# Patient Record
Sex: MALE | HISPANIC OR LATINO | URBAN - METROPOLITAN AREA
[De-identification: names, ages, dates, MRNs, and addresses within clinical notes are randomized per-mention and may not be internally consistent; named-entity substitution may affect disease eponyms.]

---

## 2018-12-03 NOTE — IMPRESSION/PLAN
Impression: Other chronic allergic conjunctivitis: H10.45. Plan: Discussed condition with pt. Recommend use Opcon A for itching.

## 2018-12-03 NOTE — IMPRESSION/PLAN
Impression: Vitreous degeneration, bilateral: H43.813. Plan: Discussed diagnosis in detail with patient. No treatment is required at this time. Will continue to observe condition and or symptoms. Patient instructed to call if condition gets worse.

## 2018-12-03 NOTE — IMPRESSION/PLAN
Impression: Type 2 diabetes mellitus w/o complication: E86.4. Plan: Diabetes type II: no background retinopathy, no signs of neovascularization noted. Discussed ocular and systemic benefits of blood sugar control.

## 2020-02-26 NOTE — IMPRESSION/PLAN
Impression: Type 2 diabetes mellitus w/o complication: N87.5. OU. Plan: Diabetes type II: no background retinopathy, no signs of neovascularization noted. Discussed ocular and systemic benefits of blood sugar control.

## 2023-01-27 ENCOUNTER — OFFICE VISIT (OUTPATIENT)
Dept: URBAN - METROPOLITAN AREA CLINIC 23 | Facility: CLINIC | Age: 83
End: 2023-01-27
Payer: MEDICARE

## 2023-01-27 DIAGNOSIS — H02.831 DERMATOCHALASIS OF RIGHT UPPER EYELID: ICD-10-CM

## 2023-01-27 DIAGNOSIS — H16.223 KERATOCONJUNCTIVITIS SICCA, NOT SPECIFIED AS SJÖGREN'S, BILATERAL: ICD-10-CM

## 2023-01-27 DIAGNOSIS — H02.834 DERMATOCHALASIS OF LEFT UPPER EYELID: ICD-10-CM

## 2023-01-27 DIAGNOSIS — E11.9 TYPE 2 DIABETES MELLITUS W/O COMPLICATION: Primary | ICD-10-CM

## 2023-01-27 DIAGNOSIS — H25.13 AGE-RELATED NUCLEAR CATARACT, BILATERAL: ICD-10-CM

## 2023-01-27 PROCEDURE — 99214 OFFICE O/P EST MOD 30 MIN: CPT | Performed by: OPTOMETRIST

## 2023-01-27 ASSESSMENT — INTRAOCULAR PRESSURE
OD: 11
OS: 11

## 2023-01-27 ASSESSMENT — KERATOMETRY
OD: 43.88
OS: 42.88

## 2023-01-27 NOTE — IMPRESSION/PLAN
Impression: Type 2 diabetes mellitus w/o complication: L06.1. Plan: Discussed diagnosis in detail with patient. Advised and emphasized patient of blood sugar control. Discussed risks of progression. Poor compliance can lead to blindness. Optos performed and reviewed with patient today. Reassured patient of condition and treatment. Will continue to observe condition and or symptoms.

## 2023-01-27 NOTE — IMPRESSION/PLAN
Impression: Dermatochalasis of right upper eyelid: H02.831. Plan: Pt edu on all findings. Refer to Dr. Samanta Cohen for lid consult.

## 2023-02-20 ENCOUNTER — OFFICE VISIT (OUTPATIENT)
Dept: URBAN - METROPOLITAN AREA CLINIC 28 | Facility: CLINIC | Age: 83
End: 2023-02-20
Payer: MEDICARE

## 2023-02-20 DIAGNOSIS — G51.31 CLONIC HEMIFACIAL SPASM, RIGHT: Primary | ICD-10-CM

## 2023-02-20 DIAGNOSIS — H53.40 VISUAL FIELD DEFECT: ICD-10-CM

## 2023-02-20 DIAGNOSIS — Z86.73 PERSONAL HISTORY OF STROKE W/O RESIDUAL DEFICIT: ICD-10-CM

## 2023-02-20 PROCEDURE — 99204 OFFICE O/P NEW MOD 45 MIN: CPT | Performed by: OPHTHALMOLOGY

## 2023-02-20 PROCEDURE — 92083 EXTENDED VISUAL FIELD XM: CPT | Performed by: OPHTHALMOLOGY

## 2023-02-20 ASSESSMENT — INTRAOCULAR PRESSURE
OS: 17
OD: 17

## 2023-02-20 NOTE — IMPRESSION/PLAN
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 Plan: Patient examined. Chart reviewed. Patient has Hemifacial Spasm, and no prior h/o [place] Palsy. The standard of care for management is Botulinum Toxin A. 

36285 - CHEMODENERVATION OF MUSCLE(S) INNERVATED BY FACIAL NERVE, UNILATERAL (eg HFS or BEB) - BOTOX (50 units needed) [address]-91 Botulinum Toxin 100 unit vial.

## 2023-02-20 NOTE — IMPRESSION/PLAN
Impression: Visual field defect: H53.40. Plan: Patient subjectively describes peripheral field defect. VF ordered & reviewed. Formal visual field testing reflects the clinical concern at this time. Ptosis effect OD at 10 degrees untapped & 40 degrees taped improvement. Ptosis effect OS at 10 degrees untapped & 30 degrees taped improvement.

## 2023-02-20 NOTE — IMPRESSION/PLAN
Impression: Personal history of stroke w/o residual deficit: Z86.73. Plan: Stroke in 30's, numbness.

## 2023-04-17 NOTE — IMPRESSION/PLAN
Impression: Dermatochalasis of right lower eyelid: H02.832. Plan: Shilpa Pantoja WISHES TO PURSUE COSMETIC LOWER EYELID SURGERY.  PLAN 29975 - RT, LT.

## 2023-04-17 NOTE — IMPRESSION/PLAN
Impression: Dermatochalasis of right upper eyelid: H02.831. Plan: Patient examined. Chart review. Patient has signs, symptoms and findings consistent with BOTH myogenic and mechanical ptosis. This was diagrammatically explaind to the patient. Patient voiced understanding. Discussed known options for management (do surgical intervention.) Risks of surgery discussed: bleeding, infection, dry eye, asymmetry, numbness, and/or need for further surgery. Patient wishes to proceed  with surgery.   


05062 RT, LT BILATERAL UPPER LID EXTERNAL APPROACH TARSOLEVATOR ADVANCEMENT

95610 - SKN ONLY UPPER LID BLEPAHROPLASTY

## 2023-04-17 NOTE — IMPRESSION/PLAN
Impression: Visual field defect: H53.40. Plan: Patioent expressed desire to have ptosis corrected. RV for ptosis screening (ptosis visual field) in 2-3 weeks.

## 2023-04-17 NOTE — IMPRESSION/PLAN
Impression: Encounter for cosmetic surgery: Z41.1. Plan: PATIENT SEEKING COSMETOIC LOWER EYELID BLEPHAROPLASTY. HE IS CONCERTNED WITH ASYMMETRYFOLLOWING PREVIOUS SURGERY.

## 2023-04-17 NOTE — IMPRESSION/PLAN
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 Plan: Patient examined, improvement of spams post botox.  Discussed to return in 3 mo for same pattern botox injection